# Patient Record
Sex: FEMALE | Race: WHITE | NOT HISPANIC OR LATINO | URBAN - METROPOLITAN AREA
[De-identification: names, ages, dates, MRNs, and addresses within clinical notes are randomized per-mention and may not be internally consistent; named-entity substitution may affect disease eponyms.]

---

## 2017-03-07 ENCOUNTER — IMPORTED ENCOUNTER (OUTPATIENT)
Dept: URBAN - METROPOLITAN AREA CLINIC 50 | Facility: CLINIC | Age: 77
End: 2017-03-07

## 2017-03-10 ENCOUNTER — IMPORTED ENCOUNTER (OUTPATIENT)
Dept: URBAN - METROPOLITAN AREA CLINIC 50 | Facility: CLINIC | Age: 77
End: 2017-03-10

## 2017-06-19 ENCOUNTER — IMPORTED ENCOUNTER (OUTPATIENT)
Dept: URBAN - METROPOLITAN AREA CLINIC 50 | Facility: CLINIC | Age: 77
End: 2017-06-19

## 2017-06-27 ENCOUNTER — IMPORTED ENCOUNTER (OUTPATIENT)
Dept: URBAN - METROPOLITAN AREA CLINIC 50 | Facility: CLINIC | Age: 77
End: 2017-06-27

## 2018-03-09 ENCOUNTER — IMPORTED ENCOUNTER (OUTPATIENT)
Dept: URBAN - METROPOLITAN AREA CLINIC 50 | Facility: CLINIC | Age: 78
End: 2018-03-09

## 2019-06-11 NOTE — PATIENT DISCUSSION
The patient had a lesion on the left lower  eyelid. After informed consent the lesion was anesthetized with local anesthetic, 1% lidocane with epinephrine 1:100,000 units. Sterile technique was used to remove the lesion with Emily scissors. Antibiotic ointment was used to treat the area where lesion was removed. Lesion was sent to pathology for analysis. The patient was given written post operative wound care instructions and a prescription for antibiotic ointment. The patient was asked to call  within 10 days if they had not been otherwise called by our office with the result of the biopsy.

## 2019-11-15 ENCOUNTER — IMPORTED ENCOUNTER (OUTPATIENT)
Dept: URBAN - METROPOLITAN AREA CLINIC 50 | Facility: CLINIC | Age: 79
End: 2019-11-15

## 2020-12-04 ENCOUNTER — IMPORTED ENCOUNTER (OUTPATIENT)
Dept: URBAN - METROPOLITAN AREA CLINIC 50 | Facility: CLINIC | Age: 80
End: 2020-12-04

## 2021-04-17 ASSESSMENT — VISUAL ACUITY
OD_CC: J1
OD_CC: 20/20-1
OS_CC: 20/20-1
OS_BAT: 20/50
OD_CC: J1+
OS_CC: J1
OD_CC: 20/25
OS_CC: 20/25
OS_OTHER: 20/50. 20/80.
OS_OTHER: 20/30. 20/40.
OS_BAT: 20/30
OS_CC: 20/30-
OD_BAT: 20/30
OD_OTHER: 20/50. 20/60.
OS_CC: J1+
OS_CC: 20/25+
OS_PH: 20/30
OD_CC: J1+@ 17 IN
OS_CC: J1+@ 17 IN
OS_OTHER: 20/70. 20/100.
OD_BAT: 20/50
OD_OTHER: 20/30. 20/60.
OS_PH: 20/25
OD_OTHER: 20/30. 20/40.
OS_CC: 20/25-2
OD_CC: 20/20-2
OS_BAT: 20/70
OD_BAT: 20/30
OD_CC: 20/20
OD_CC: 20/25-

## 2021-04-17 ASSESSMENT — TONOMETRY
OS_IOP_MMHG: 13
OS_IOP_MMHG: 16
OS_IOP_MMHG: 16
OD_IOP_MMHG: 15
OS_IOP_MMHG: 16
OD_IOP_MMHG: 14
OD_IOP_MMHG: 16
OD_IOP_MMHG: 16
OS_IOP_MMHG: 15
OD_IOP_MMHG: 16

## 2021-12-22 ENCOUNTER — PREPPED CHART (OUTPATIENT)
Dept: URBAN - METROPOLITAN AREA CLINIC 49 | Facility: CLINIC | Age: 81
End: 2021-12-22

## 2021-12-29 ENCOUNTER — COMPREHENSIVE EXAM (OUTPATIENT)
Dept: URBAN - METROPOLITAN AREA CLINIC 49 | Facility: CLINIC | Age: 81
End: 2021-12-29

## 2021-12-29 DIAGNOSIS — H25.13: ICD-10-CM

## 2021-12-29 DIAGNOSIS — H35.362: ICD-10-CM

## 2021-12-29 DIAGNOSIS — H18.513: ICD-10-CM

## 2021-12-29 DIAGNOSIS — H04.123: ICD-10-CM

## 2021-12-29 DIAGNOSIS — H43.813: ICD-10-CM

## 2021-12-29 PROCEDURE — 92134 CPTRZ OPH DX IMG PST SGM RTA: CPT

## 2021-12-29 PROCEDURE — 92014 COMPRE OPH EXAM EST PT 1/>: CPT

## 2021-12-29 PROCEDURE — 92015 DETERMINE REFRACTIVE STATE: CPT

## 2021-12-29 ASSESSMENT — VISUAL ACUITY
OS_CC: 20/40
OS_GLARE: 20/40
OD_PH: 20/30
OU_CC: J1+
OS_GLARE: 20/50
OD_GLARE: 20/40
OD_CC: 20/30-2
OD_GLARE: 20/40
OS_PH: 20/30+1

## 2021-12-29 ASSESSMENT — TONOMETRY
OD_IOP_MMHG: 15
OS_IOP_MMHG: 15

## 2022-10-06 ENCOUNTER — ESTABLISHED PATIENT (OUTPATIENT)
Dept: URBAN - METROPOLITAN AREA CLINIC 49 | Facility: CLINIC | Age: 82
End: 2022-10-06

## 2022-10-06 DIAGNOSIS — H00.14: ICD-10-CM

## 2022-10-06 DIAGNOSIS — H18.513: ICD-10-CM

## 2022-10-06 PROCEDURE — 92012 INTRM OPH EXAM EST PATIENT: CPT

## 2022-10-06 RX ORDER — TOBRAMYCIN AND DEXAMETHASONE 1; 3 MG/ML; MG/ML: 1 SUSPENSION/ DROPS OPHTHALMIC

## 2022-10-06 RX ORDER — CEPHALEXIN 500 MG/1: 1 CAPSULE ORAL TWICE A DAY

## 2022-10-06 ASSESSMENT — TONOMETRY
OD_IOP_MMHG: 12
OS_IOP_MMHG: 13

## 2022-10-06 ASSESSMENT — VISUAL ACUITY
OD_CC: 20/25-2
OS_CC: 20/50
OS_PH: 20/30

## 2022-10-06 NOTE — PATIENT DISCUSSION
Patient to start keflex 500 Mg PO BID for 7 days, and start Tobradex OS QID for 7 days then stop. Recommend warm compresses, at patient's discretion.

## 2022-12-29 ENCOUNTER — COMPREHENSIVE EXAM (OUTPATIENT)
Dept: URBAN - METROPOLITAN AREA CLINIC 49 | Facility: CLINIC | Age: 82
End: 2022-12-29

## 2022-12-29 PROCEDURE — 92014 COMPRE OPH EXAM EST PT 1/>: CPT

## 2022-12-29 PROCEDURE — 92134 CPTRZ OPH DX IMG PST SGM RTA: CPT

## 2022-12-29 PROCEDURE — 92015 DETERMINE REFRACTIVE STATE: CPT

## 2022-12-29 ASSESSMENT — VISUAL ACUITY
OS_GLARE: 20/60
OD_CC: 20/40
OS_CC: 20/50-1
OD_GLARE: 20/40
OU_CC: 20/30
OS_PH: 20/30
OU_SC: 20/40-2
OS_GLARE: 20/80
OS_SC: 20/100-1
OD_GLARE: 20/60
OD_PH: 20/30
OD_SC: 20/50-1

## 2022-12-29 ASSESSMENT — TONOMETRY
OS_IOP_MMHG: 13
OD_IOP_MMHG: 13

## 2022-12-29 NOTE — PATIENT DISCUSSION
Discussed cataracts in detail with patient. Will continue to monitor patient. Patient would like to have surgery with Dr. Rancho Crump when ready.

## 2023-03-15 PROBLEM — Z96.1: Noted: 2023-03-15

## 2023-03-29 PROBLEM — Z96.1: Noted: 2023-03-29

## 2023-03-29 PROBLEM — Z96.1: Noted: 2023-03-15

## 2023-04-07 ENCOUNTER — POST-OP (OUTPATIENT)
Dept: URBAN - METROPOLITAN AREA CLINIC 49 | Facility: CLINIC | Age: 83
End: 2023-04-07

## 2023-04-07 DIAGNOSIS — Z98.41: ICD-10-CM

## 2023-04-07 DIAGNOSIS — Z96.1: ICD-10-CM

## 2023-04-07 PROCEDURE — 99024 POSTOP FOLLOW-UP VISIT: CPT

## 2023-04-07 ASSESSMENT — TONOMETRY
OS_IOP_MMHG: 10
OD_IOP_MMHG: 10

## 2023-04-07 ASSESSMENT — VISUAL ACUITY
OD_SC: 20/20
OS_SC: 20/20-2

## 2023-05-05 ENCOUNTER — POST-OP (OUTPATIENT)
Dept: URBAN - METROPOLITAN AREA CLINIC 49 | Facility: CLINIC | Age: 83
End: 2023-05-05

## 2023-05-05 DIAGNOSIS — Z98.41: ICD-10-CM

## 2023-05-05 DIAGNOSIS — Z96.1: ICD-10-CM

## 2023-05-05 PROCEDURE — 99024 POSTOP FOLLOW-UP VISIT: CPT

## 2023-05-05 ASSESSMENT — VISUAL ACUITY
OS_SC: 20/20
OU_SC: J3@16
OD_SC: 20/20

## 2023-05-05 ASSESSMENT — KERATOMETRY
OS_AXISANGLE_DEGREES: 178
OD_AXISANGLE2_DEGREES: 90
OS_K1POWER_DIOPTERS: 44.75
OS_AXISANGLE2_DEGREES: 88
OD_AXISANGLE_DEGREES: 0
OD_K2POWER_DIOPTERS: 45.00
OD_K1POWER_DIOPTERS: 45.00
OS_K2POWER_DIOPTERS: 45.25

## 2023-05-05 ASSESSMENT — TONOMETRY
OD_IOP_MMHG: 13
OS_IOP_MMHG: 13

## 2023-05-18 ENCOUNTER — CONTACT LENSES/GLASSES VISIT (OUTPATIENT)
Dept: URBAN - METROPOLITAN AREA CLINIC 49 | Facility: CLINIC | Age: 83
End: 2023-05-18

## 2023-05-18 DIAGNOSIS — H52.4: ICD-10-CM

## 2023-05-18 DIAGNOSIS — Z96.1: ICD-10-CM

## 2023-05-18 DIAGNOSIS — H35.371: ICD-10-CM

## 2023-05-18 DIAGNOSIS — H26.493: ICD-10-CM

## 2023-05-18 DIAGNOSIS — Z98.41: ICD-10-CM

## 2023-05-18 PROCEDURE — 92015GRNC REFRACTION GLASSES RECHECK NO CHARGE

## 2023-05-18 ASSESSMENT — VISUAL ACUITY
OD_SC: 20/20
OU_SC: 20/20
OS_SC: 20/20

## 2024-05-08 ENCOUNTER — COMPREHENSIVE EXAM (OUTPATIENT)
Dept: URBAN - METROPOLITAN AREA CLINIC 49 | Facility: LOCATION | Age: 84
End: 2024-05-08

## 2024-05-08 DIAGNOSIS — H26.493: ICD-10-CM

## 2024-05-08 DIAGNOSIS — H35.363: ICD-10-CM

## 2024-05-08 DIAGNOSIS — H43.813: ICD-10-CM

## 2024-05-08 PROCEDURE — 99214 OFFICE O/P EST MOD 30 MIN: CPT

## 2024-05-08 PROCEDURE — 92134 CPTRZ OPH DX IMG PST SGM RTA: CPT

## 2024-05-08 ASSESSMENT — VISUAL ACUITY
OS_GLARE: 20/40
OS_GLARE: 20/60
OS_CC: 20/25
OU_CC: J1+ @ 16"
OD_GLARE: 20/25
OD_GLARE: 20/20
OD_CC: 20/20-2

## 2024-05-08 ASSESSMENT — TONOMETRY
OD_IOP_MMHG: 11
OS_IOP_MMHG: 11

## 2025-02-28 ENCOUNTER — FOLLOW UP (OUTPATIENT)
Age: 85
End: 2025-02-28

## 2025-02-28 DIAGNOSIS — H11.32: ICD-10-CM

## 2025-02-28 PROCEDURE — 99212 OFFICE O/P EST SF 10 MIN: CPT

## 2025-08-15 ENCOUNTER — COMPREHENSIVE EXAM (OUTPATIENT)
Age: 85
End: 2025-08-15

## 2025-08-15 DIAGNOSIS — H26.493: ICD-10-CM

## 2025-08-15 DIAGNOSIS — H04.123: ICD-10-CM

## 2025-08-15 DIAGNOSIS — H52.4: ICD-10-CM

## 2025-08-15 DIAGNOSIS — H35.363: ICD-10-CM

## 2025-08-15 DIAGNOSIS — H43.813: ICD-10-CM

## 2025-08-15 PROCEDURE — 99214 OFFICE O/P EST MOD 30 MIN: CPT
